# Patient Record
Sex: FEMALE | Race: WHITE | URBAN - METROPOLITAN AREA
[De-identification: names, ages, dates, MRNs, and addresses within clinical notes are randomized per-mention and may not be internally consistent; named-entity substitution may affect disease eponyms.]

---

## 2022-04-07 ENCOUNTER — HISTORICAL (OUTPATIENT)
Dept: ADMINISTRATIVE | Facility: HOSPITAL | Age: 42
End: 2022-04-07

## 2022-04-23 VITALS
OXYGEN SATURATION: 99 % | DIASTOLIC BLOOD PRESSURE: 82 MMHG | WEIGHT: 160.5 LBS | HEIGHT: 69 IN | SYSTOLIC BLOOD PRESSURE: 132 MMHG | BODY MASS INDEX: 23.77 KG/M2

## 2024-02-01 LAB
HBA1C MFR BLD: 6.2 % (ref 4–6)
Lab: 214

## 2024-05-06 NOTE — PROGRESS NOTES
[facility] Breast Surg  Breast Surgical Oncology  New Patient Office Visit - H&P      Referring Provider:  Self-referral  PCP: No, Primary Doctor     Chief Complaint:   Chief Complaint   Patient presents with    Breast Cyst     Patient report left breast cyst x 1 month, tenderness, no swelling, no redness      Subjective:     HPI:  Nilda Palma is a 44 y.o. female who presents on 2024 for evaluation of a left breast lump which she felt on self exam about 1 month ago. She denies any associated symptoms such as rashes, redness, pain, swelling, nipple discharge. Her breast history includes implant placement in , exchange in , and removal in 2018. She denies history of any prior breast issues. She presented for same-day imaging today which was benign and revealed a benign cyst in the area of palpable concern. Fibrocystic changes and surgical changes noted as well. She has no family history of breast cancer.    Imagin2024 - 1.  No mammographic evidence of malignancy in either breast. 2.  Multiple benign cysts of varying size and complication in the 11:00 to 3:00 left breast subareolar to 3 cm from the nipple, anterior to middle depths.  One of the larger complicated cysts in the 11:00 left breast 2 cm from the nipple, measuring 1.3 cm in greatest dimension, corresponds to the patient's palpable concern.  3.  Post surgical changes of bilateral breasts related to interval explantation and mastopexy.    Pathology:  None    OB/GYN History:  Age at Menarche Onset: 10  Menopausal Status: premenopausal, LMP: Patient's last menstrual period was 2024.  Hysterectomy/Oophorectomy: no, neither  Hormonal birth control (duration): none  Pregnancy History:   Age at first live birth: n/a  Hormone Replacement Therapy: No, none    Other:  Prior thoracic RT: none  Genetic testing: none  Ashkenazi Sabianism descent: No    Family History:  Family History   Problem  Relation Name Age of Onset    Cervical cancer Sister  20    Hypertension Mother Sammi Palma     Cancer Father Walter Palma         Colon cancer    Heart failure Father Walter Palma         CHF    Hypertension Father Walter Palma     Birth defects Father Walter Palma     COPD Father Walter Palma     Cancer Maternal Grandfather Kurt Droddy         Oat cell melanoma    Hypertension Maternal Grandmother Amrita Droddy         Brain Aneurysm    Heart failure Paternal Grandmother Daisha Palma         Heart Attack    Cancer Sister Sonja Palma         T-cell Lymphoma    Migraines Sister Sonja Palma     Rashes / Skin problems Sister Sonja Palma         Psoriasis    Cervical cancer Sister Sonja Palma         HPV    Breast cancer Neg Hx          neg fam history of breast cancer        Patient History:  History reviewed. No pertinent past medical history.    Active Problem List with Overview Notes    Diagnosis Date Noted    Fibrocystic breast changes 05/08/2024        Past Surgical History:   Procedure Laterality Date    AUGMENTATION OF BREAST  1999, 2013, 2019    Implants, Implants, Implant removal and breast lift    BREAST SURGERY  1999,2013,2019    implants, implants, implant removal    COSMETIC SURGERY  Lasik eye surgery    EYE SURGERY  2016    Lasik       Social History     Socioeconomic History    Marital status:    Tobacco Use    Smoking status: Former     Current packs/day: 0.25     Average packs/day: 0.3 packs/day for 10.0 years (2.5 ttl pk-yrs)     Types: Cigarettes    Smokeless tobacco: Never   Substance and Sexual Activity    Alcohol use: Yes     Alcohol/week: 10.0 standard drinks of alcohol     Types: 10 Glasses of wine per week    Drug use: Not Currently     Types: Cocaine, GHB, Hydrocodone, Ketamine, LSD, Marijuana, MDMA (Ecstacy), Methamphetamines, Oxycodone    Sexual activity: Yes     Partners: Male     Birth control/protection: None         There is no immunization  "history on file for this patient.    Medications/Allergies:  No current outpatient medications on file.     Review of patient's allergies indicates:  No Known Allergies    Review of Systems:  ROS -  negative     Objective:     Vitals:  Vitals:    05/08/24 0953   BP: (P) 139/86   Pulse: (P) 67   Temp: (P) 97.6 °F (36.4 °C)   TempSrc: (P) Oral   SpO2: (P) 99%   Weight: 69.8 kg (153 lb 12.8 oz)   Height: 5' 8" (1.727 m)       Body mass index is 23.39 kg/m².     Physical Exam:  General: The patient is awake, alert and oriented times three. The patient is well nourished and in no acute distress.  Neck: There is no evidence of palpable cervical, supraclavicular or axillary adenopathy. The neck is supple. The thyroid is not enlarged.  Musculoskeletal: The patient has a normal range of motion of her bilateral upper extremities.  Chest: Examination of the chest wall fails to reveal any obvious abnormalities. Nonlabored breathing, symmetric expansion.  Breast:  Right:  Examination of right breast fails to reveal any dominant masses or areas of significant focal nodularity. The nipple is everted without evidence of discharge. There is no skin dimpling with movement of the pectoralis. There are no significant skin changes overlying the breast.   Left: In the area of patient's palpable concern in the 12:00 axis subareolar, there is a 1-2 cm oval shaped soft freely mobile lump consistent with benign cyst. Examination of the left breast fails to reveal any dominant masses or areas of significant focal nodularity. The nipple is everted without evidence of discharge. There is no skin dimpling with movement of the pectoralis. There are no significant skin changes overlying the breast.  Abdomen: The abdomen is soft, flat, nontender and nondistended.  Integumentary: no rashes or skin lesions present  Neurologic: cranial nerves intact, no signs of peripheral neurological deficit, motor/sensory function intact      Assessment: " "    Patient Active Problem List   Diagnosis    Fibrocystic breast changes        Nilda Matias" was seen today for breast cyst.    Diagnoses and all orders for this visit:    Encounter to establish care with new doctor  -     Ambulatory referral/consult to Family Practice; Future    Breast cyst, left    Fibrocystic breast changes, unspecified laterality    History of augmentation of both breasts    Screening mammogram, encounter for  -     Mammo Digital Screening Bilat w/ Ian; Future            Plan:        Today's mammogram and clinical exam are most consistent with a benign cyst in the patient's area of palpable concern. She also has benign post surgical changes from prior breast augmentations. Reassurance provided.    Recommend continuing annual screening, next due in 1 year. RTC as needed.    Healthy lifestyle guidelines were reviewed. She was encouraged to engage in regular exercise, maintain a healthy body weight, and avoid excessive alcohol consumption. Healthy nutritional guidelines were also discussed. Self-breast examination was reviewed with the patient in detail and she was encouraged to perform this on a monthly basis.    Recommend referral to PCP to establish care. Patient has no medical insurance and is self pay. Will refer to [facility] clinic. Also discussed option of MD VIP if affordable to patient, which patient will look into.       All of her questions were answered. She was advised to call if she develops any questions or concerns.    Carol Chatterjee PA-C       ----------------------------------------------------------------------------------------------------------------------------   Total time on the date of the visit ranged from 30-44 minutes ([id number]). Total time includes both face-to-face and non-face-to-face time personally spent by myself on the day of the visit.    Non-face-to-face time included:  _X_ preparing to see the patient such as reviewing the patient " record  _X_ obtaining and reviewing separately obtained history  _X_ independently interpreting results  _X_ documenting clinical information in electronic health record.    Face-to-face time included:  _X_ performing an appropriate history and examination  _X_ communicating results to the patient  _X_ counseling and educating the patient  __ ordering appropriate medications  _x_ ordering appropriate tests  _X_ ordering appropriate procedures (including follow-up)  _X_ answering any questions the patient had    Total Time spent on date of visit: 35 minutes

## 2024-05-08 ENCOUNTER — HOSPITAL ENCOUNTER (OUTPATIENT)
Dept: RADIOLOGY | Facility: HOSPITAL | Age: 44
Discharge: HOME | End: 2024-05-08
Attending: PHYSICIAN ASSISTANT

## 2024-05-08 ENCOUNTER — OFFICE VISIT (OUTPATIENT)
Dept: SURGERY | Facility: CLINIC | Age: 44
End: 2024-05-08
Attending: PHYSICIAN ASSISTANT

## 2024-05-08 VITALS — WEIGHT: 160 LBS | BODY MASS INDEX: 24.25 KG/M2 | HEIGHT: 68 IN

## 2024-05-08 VITALS — HEIGHT: 68 IN | BODY MASS INDEX: 23.31 KG/M2 | WEIGHT: 153.81 LBS

## 2024-05-08 DIAGNOSIS — N63.20 LUMP OF LEFT BREAST: ICD-10-CM

## 2024-05-08 DIAGNOSIS — N60.19 FIBROCYSTIC BREAST CHANGES, UNSPECIFIED LATERALITY: ICD-10-CM

## 2024-05-08 DIAGNOSIS — Z98.890 HISTORY OF AUGMENTATION OF BOTH BREASTS: ICD-10-CM

## 2024-05-08 DIAGNOSIS — Z12.31 SCREENING MAMMOGRAM, ENCOUNTER FOR: ICD-10-CM

## 2024-05-08 DIAGNOSIS — Z76.89 ENCOUNTER TO ESTABLISH CARE WITH NEW DOCTOR: Primary | ICD-10-CM

## 2024-05-08 DIAGNOSIS — N60.02 BREAST CYST, LEFT: ICD-10-CM

## 2024-05-08 PROCEDURE — 99203 OFFICE O/P NEW LOW 30 MIN: CPT | Mod: S$PBB,,, | Performed by: PHYSICIAN ASSISTANT

## 2024-05-08 PROCEDURE — 76642 ULTRASOUND BREAST LIMITED: CPT | Mod: TC,LT

## 2024-05-08 PROCEDURE — 77066 DX MAMMO INCL CAD BI: CPT | Mod: 26,,, | Performed by: RADIOLOGY

## 2024-05-08 PROCEDURE — 99214 OFFICE O/P EST MOD 30 MIN: CPT | Mod: PBBFAC,25 | Performed by: PHYSICIAN ASSISTANT

## 2024-05-08 PROCEDURE — 99999 PR PBB SHADOW E&M-EST. PATIENT-LVL IV: CPT | Mod: PBBFAC,,, | Performed by: PHYSICIAN ASSISTANT

## 2024-05-08 PROCEDURE — 77062 BREAST TOMOSYNTHESIS BI: CPT | Mod: TC

## 2024-05-08 PROCEDURE — 76642 ULTRASOUND BREAST LIMITED: CPT | Mod: 26,LT,, | Performed by: RADIOLOGY

## 2024-05-08 PROCEDURE — 77062 BREAST TOMOSYNTHESIS BI: CPT | Mod: 26,,, | Performed by: RADIOLOGY

## 2024-08-13 ENCOUNTER — OFFICE VISIT (OUTPATIENT)
Dept: FAMILY MEDICINE | Facility: CLINIC | Age: 44
End: 2024-08-13

## 2024-08-13 VITALS
BODY MASS INDEX: 24.1 KG/M2 | SYSTOLIC BLOOD PRESSURE: 128 MMHG | DIASTOLIC BLOOD PRESSURE: 83 MMHG | RESPIRATION RATE: 18 BRPM | OXYGEN SATURATION: 99 % | HEIGHT: 68 IN | TEMPERATURE: 98 F | HEART RATE: 72 BPM | WEIGHT: 159 LBS

## 2024-08-13 DIAGNOSIS — Z76.89 ENCOUNTER TO ESTABLISH CARE WITH NEW DOCTOR: ICD-10-CM

## 2024-08-13 DIAGNOSIS — G44.209 TENSION HEADACHE: Primary | ICD-10-CM

## 2024-08-13 DIAGNOSIS — L65.9 HAIR THINNING: ICD-10-CM

## 2024-08-13 PROCEDURE — 99215 OFFICE O/P EST HI 40 MIN: CPT | Mod: PBBFAC | Performed by: NURSE PRACTITIONER

## 2024-08-13 PROCEDURE — 99203 OFFICE O/P NEW LOW 30 MIN: CPT | Mod: S$PBB,,, | Performed by: NURSE PRACTITIONER

## 2024-08-13 RX ORDER — TIZANIDINE 4 MG/1
4 TABLET ORAL NIGHTLY PRN
Qty: 30 TABLET | Refills: 1 | Status: SHIPPED | OUTPATIENT
Start: 2024-08-13

## 2024-08-13 NOTE — PATIENT INSTRUCTIONS
Blair Munguia,     If you are due for any health screening(s) below please notify me so we can arrange them to be ordered and scheduled. Most healthy patients at your age complete them, but you are free to accept or refuse.     If you can't do it, I'll definitely understand. If you can, I'd certainly appreciate it!    Tests to Keep You Healthy    Mammogram: Met on 5/8/2024  Colon Cancer Screening: DUE  Cervical Cancer Screening: DUE      Its time for your colon cancer screening     Colorectal cancer is one of the leading causes of cancer death for men and women but it doesnt have to be. Screenings can prevent colorectal cancer or find it early enough to treat and cure the disease.     Our records indicate that you may be overdue for colon cancer screening. A colonoscopy or stool screening test can help identify patients at risk for developing colon cancer. Cancer screenings save lives, so schedule yours today to stay healthy.     A colonoscopy is the preferred test for detecting colon cancer. It is needed only once every 10 years if results are negative. While you are sedated, a flexible, lighted tube with a tiny camera is inserted into the rectum and advanced through the colon to look for cancers.     An alternative screening test that is used at home and returned to the lab may also be used. It detects hidden blood in bowel movements which could indicate cancer in the colon. If results are positive, you will need a colonoscopy to determine if the blood is a sign of cancer. This type of follow up (diagnostic) colonoscopy usually requires additional copays as required by your insurance provider.     If you recently had your colon cancer screening performed outside of [facility], please let your Health care team know so that they can update your health record. Please contact your PCP if you have any questions.    Your cervical cancer screening is due     Our records indicate that you may be overdue for  your screening Pap smear. A Pap smear is an important health screening that can detect abnormal cells that can become cervical cancer. Cervical cancer screenings allow for early diagnosis and increase the likelihood of successful treatment.     The current recommendation for Pap smear screening is every 3-5 years for women at average risk. We encourage you to schedule your appointment with your womens health provider. Many women see a gynecologist for this screening, but some primary care providers also provide Pap screening.     If you recently had your Pap smear screening performed outside of [facility], please let your health care team know so that they can update your health record.

## 2024-08-13 NOTE — PROGRESS NOTES
Patient Name: Nilda Palma   : 1980  MRN: [id number]     SUBJECTIVE DATA:    CHIEF COMPLAINT:   Nilda Palma is a 44 y.o. female who presents to clinic today with Establish Care and Migraine        HPI:  44-year-old female presents to the clinic to establish care and requesting medication for tension headaches.    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  Patient report neck shoulder muscle tightness causes her to have headaches.  Also state headaches sometimes when she does not eat or when she goes into a fasting diet.  Patient state her  massages her neck and usually that will help with headaches.  Discussed with patient's they are call tension headaches.  Need to schedule at least 3 small meals a day to prevent hunger headaches.  Patient verbalized  Patient denies any issue with blurry vision she had Lasix done about 5-8 years ago.  Denies any dizziness or any syncopal episodes.  Discuss she can try massage gun for muscle aches and spasms.  Discussed Rx tizanidine 4 mg p.o. as needed for muscle spasm and tension headaches, precaution discussed, patient verbalized understanding.  Return to clinic if no improvement.    Discussed care gaps and lab work.  Patient state she just completed outside labs.  Records requested.  Declines wellness physical exam.  Patient not ready to complete colorectal cancer screening.  Patient will notify the clinic when ready.    Thinning hair:  Patient state that is her hair is falling usually during showering.  Has been going on for quite some time.  Discussed with patient we can try Nizoral topical shampoo 1% twice a week  and over the counter minoxidil and apply to area of thinning.  If that does not help for 6 months we can refer you to Dermatology.  Patient to think about it.    Immunizations:  Patient declined pneumonia vaccine recommendation since  "history of splenectomy.  Advise patient she is always welcome to come back to clinic or she can go to any pharmacy for completion.  Patient verbalized understanding.    Patient denies chest pain, shortness of breath, dyspnea on exertion, palpitations, peripheral edema, abdominal pain, nausea, vomiting, diarrhea, constipation, fatigue, fever, chills, dysuria,  hematuria, dark stools.        ALLERGIES:   Review of patient's allergies indicates:   Allergen Reactions    Pecan nut Palpitations         ROS:  Review of Systems   Musculoskeletal:  Positive for neck pain.   Neurological:  Positive for headaches.   All other systems reviewed and are negative.        OBJECTIVE DATA:  Vital signs  Vitals:    08/13/24 0946   BP: 128/83   Pulse: 72   Resp: 18   Temp: 98 °F (36.7 °C)   TempSrc: Oral   SpO2: 99%   Weight: 72.1 kg (159 lb)   Height: 5' 8" (1.727 m)      Body mass index is 24.18 kg/m².    PHYSICAL EXAM:   Physical Exam  Vitals and nursing note reviewed.   Constitutional:       General: She is awake. She is not in acute distress.     Appearance: Normal appearance. She is well-developed, well-groomed and normal weight. She is not ill-appearing, toxic-appearing or diaphoretic.   HENT:      Head: Normocephalic and atraumatic.      Right Ear: Tympanic membrane, ear canal and external ear normal.      Left Ear: Tympanic membrane, ear canal and external ear normal.      Nose: Nose normal.      Mouth/Throat:      Lips: Pink.      Mouth: Mucous membranes are moist.      Tongue: Tongue does not deviate from midline.      Palate: No lesions.      Pharynx: Oropharynx is clear. Uvula midline.   Eyes:      General: Lids are normal. Gaze aligned appropriately. No scleral icterus.     Extraocular Movements: Extraocular movements intact.      Conjunctiva/sclera: Conjunctivae normal.      Pupils: Pupils are equal, round, and reactive to light.   Neck:      Thyroid: No thyroid mass, thyromegaly or thyroid tenderness.      Trachea: " Trachea and phonation normal.     Cardiovascular:      Rate and Rhythm: Normal rate and regular rhythm.      Pulses: Normal pulses.           Carotid pulses are 2+ on the right side and 2+ on the left side.       Radial pulses are 2+ on the right side and 2+ on the left side.        Femoral pulses are 2+ on the right side and 2+ on the left side.       Dorsalis pedis pulses are 2+ on the right side and 2+ on the left side.        Posterior tibial pulses are 2+ on the right side and 2+ on the left side.      Heart sounds: Normal heart sounds. No murmur heard.  Pulmonary:      Effort: Pulmonary effort is normal.      Breath sounds: Normal breath sounds and air entry. No wheezing or rhonchi.   Abdominal:      General: Abdomen is flat. Bowel sounds are normal. There is no distension.      Palpations: Abdomen is soft. There is no mass.      Tenderness: There is no abdominal tenderness. There is no right CVA tenderness, left CVA tenderness, guarding or rebound.   Genitourinary:     Comments: Physical exam deferred.  Denies any genital or urinary symptoms.  Musculoskeletal:         General: Normal range of motion.      Cervical back: Full passive range of motion without pain, normal range of motion and neck supple. No rigidity or tenderness. Muscular tenderness present. No pain with movement. Normal range of motion.      Right lower leg: No edema.      Left lower leg: No edema.   Lymphadenopathy:      Cervical: No cervical adenopathy.   Skin:     General: Skin is warm and dry.      Capillary Refill: Capillary refill takes less than 2 seconds.      Findings: No rash.   Neurological:      General: No focal deficit present.      Mental Status: She is alert and oriented to person, place, and time. Mental status is at baseline.      GCS: GCS eye subscore is 4. GCS verbal subscore is 5. GCS motor subscore is 6.      Cranial Nerves: Cranial nerves 2-12 are intact. No cranial nerve deficit.      Sensory: Sensation is intact. No  sensory deficit.      Motor: Motor function is intact. No weakness.      Coordination: Coordination is intact. Coordination normal.      Gait: Gait is intact. Gait normal.   Psychiatric:         Attention and Perception: Attention and perception normal.         Mood and Affect: Mood and affect normal.         Speech: Speech normal.         Behavior: Behavior normal. Behavior is cooperative.         Thought Content: Thought content normal.         Cognition and Memory: Cognition and memory normal.         Judgment: Judgment normal.          ASSESSMENT/PLAN:  1. 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  Patient report neck shoulder muscle tightness causes her to have headaches.  Also state headaches sometimes when she does not eat or when she goes into a fasting diet.  Patient state her  massages her neck and usually that will help with headaches.  Discussed with patient's they are call tension headaches.  Need to schedule at least 3 small meals a day to prevent hunger headaches.  Patient verbalized  Patient denies any issue with blurry vision she had Lasix done about 5-8 years ago.  Denies any dizziness or any syncopal episodes.  Discuss she can try massage gun for muscle aches and spasms.  Discussed Rx tizanidine 4 mg p.o. as needed for muscle spasm and tension headaches, precaution discussed, patient verbalized understanding.  Return to clinic if no improvement.    Orders:  -     tiZANidine (ZANAFLEX) 4 MG tablet; Take 1 tablet (4 mg total) by mouth nightly as needed (pain).  Dispense: 30 tablet; Refill: 1    2. Encounter to establish care with new doctor  Assessment & Plan:  Discussed care gaps and lab work.  Patient state she just completed outside labs.  Records requested.  Declines wellness physical exam.  Patient not ready to complete colorectal cancer screening.  Patient will  notify the clinic when ready.  Immunizations:  Patient declined pneumonia vaccine recommendation since history of splenectomy.  Advise patient she is always welcome to come back to clinic or she can go to any pharmacy for completion.  Patient verbalized understanding.    Orders:  -     Ambulatory referral/consult to Family Practice    3. Hair thinning  Assessment & Plan:  Patient state that is her hair is falling usually during showering.  Has been going on for quite some time.  Discussed with patient we can try Nizoral topical shampoo 1% twice a week  and over the counter minoxidil and apply to area of thinning.  If that does not help for 6 months we can refer you to Dermatology.  Patient to think about it.             RESULTS:  No results found for this or any previous visit (from the past 1008 hour(s)).      Follow Up:  Follow up in about 6 months (around 2/13/2025).     30 minutes of total time spent on the encounter, which includes face to face time and non-face to face time preparing to see the patient (eg, review of tests), Obtaining and/or reviewing separately obtained history, Documenting clinical information in the electronic or other health record, Independently interpreting results (not separately reported) and communicating results to the patient/family/caregiver, or Care coordination (not separately reported).        Previous medical history/lab work/radiology reviewed and considered during medical management decisions.   Medication list reviewed and medication reconciliation performed.  Patient was provided  and care about his/her current diagnosis (es) and medications including risk/benefit and side effects/adverse events, over the counter medication uses/doses, home self-care and contact precautions,  and red flags and indications for when to seek immediate medical attention.   Patient was advised to continue compliance with current medication list and medical recommendations.  Patient dvised  continued compliance with recommended eating habits/ diets for medical conditions and exercise 150 minutes/ week (if possible) for medical condition (s).  Educational handouts and instructions on selected disease management in AVS (After Visit Summary).    All of the patient's questions were answered to patient's satisfaction.   The patient was receptive, expressed verbal understanding and agreement the above plan.          This note was created with the assistance of a voice recognition software or phone dictation. There may be transcription errors as a result of using this technology however minimal. Effort has been made to assure accuracy of transcription but any obvious errors or omissions should be clarified with the author of the document

## 2024-08-16 PROBLEM — G44.209 TENSION HEADACHE: Status: ACTIVE | Noted: 2024-08-16

## 2024-08-16 PROBLEM — L65.9 HAIR THINNING: Status: ACTIVE | Noted: 2024-08-16

## 2024-08-16 PROBLEM — Z76.89 ENCOUNTER TO ESTABLISH CARE WITH NEW DOCTOR: Status: ACTIVE | Noted: 2024-08-16

## 2024-08-16 NOTE — ASSESSMENT & PLAN NOTE
Patient state that is her hair is falling usually during showering.  Has been going on for quite some time.  Discussed with patient we can try Nizoral topical shampoo 1% twice a week  and over the counter minoxidil and apply to area of thinning.  If that does not help for 6 months we can refer you to Dermatology.  Patient to think about it.

## 2024-08-16 NOTE — ASSESSMENT & PLAN NOTE
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  Patient report neck shoulder muscle tightness causes her to have headaches.  Also state headaches sometimes when she does not eat or when she goes into a fasting diet.  Patient state her  massages her neck and usually that will help with headaches.  Discussed with patient's they are call tension headaches.  Need to schedule at least 3 small meals a day to prevent hunger headaches.  Patient verbalized  Patient denies any issue with blurry vision she had Lasix done about 5-8 years ago.  Denies any dizziness or any syncopal episodes.  Discuss she can try massage gun for muscle aches and spasms.  Discussed Rx tizanidine 4 mg p.o. as needed for muscle spasm and tension headaches, precaution discussed, patient verbalized understanding.  Return to clinic if no improvement.

## 2024-08-16 NOTE — ASSESSMENT & PLAN NOTE
Discussed care gaps and lab work.  Patient state she just completed outside labs.  Records requested.  Declines wellness physical exam.  Patient not ready to complete colorectal cancer screening.  Patient will notify the clinic when ready.  Immunizations:  Patient declined pneumonia vaccine recommendation since history of splenectomy.  Advise patient she is always welcome to come back to clinic or she can go to any pharmacy for completion.  Patient verbalized understanding.

## 2024-09-04 ENCOUNTER — TELEPHONE (OUTPATIENT)
Dept: FAMILY MEDICINE | Facility: CLINIC | Age: 44
End: 2024-09-04

## 2024-09-04 DIAGNOSIS — Z76.89 ENCOUNTER TO ESTABLISH CARE WITH NEW DOCTOR: Primary | ICD-10-CM

## 2024-09-10 ENCOUNTER — TELEPHONE (OUTPATIENT)
Dept: FAMILY MEDICINE | Facility: CLINIC | Age: 44
End: 2024-09-10

## 2024-09-10 NOTE — TELEPHONE ENCOUNTER
Left a voicemail to discuss outpatient labs.  Will advise nurse to call patient to discuss labs.  Labs are within acceptable range.  No medications needed at this time.

## 2024-09-18 ENCOUNTER — TELEPHONE (OUTPATIENT)
Dept: FAMILY MEDICINE | Facility: CLINIC | Age: 44
End: 2024-09-18

## 2024-09-18 NOTE — TELEPHONE ENCOUNTER
----- Message from ROSCOE Escalante sent at 9/10/2024  5:48 PM CDT -----  Regarding: Lab results  Call patient left voicemail.  Please call patient and let her know lab results that she collected from February 1, 2024 within acceptable range.  No medications necessary at this time.  Continue to stay physically active and exercise at least 30 minutes a day up to 5 days a week as simple as brisk walking.  Keep fiber intake between 25-30 g per day.  Keep salt intake less than 3 g per day.  Healthy fat choices.  Healthy food choices.  Return to clinic as scheduled or sooner if needed.  If She needs me to call her back and to discuss results myself please let me know.  Rx